# Patient Record
Sex: MALE | Race: BLACK OR AFRICAN AMERICAN | NOT HISPANIC OR LATINO | Employment: FULL TIME | URBAN - METROPOLITAN AREA
[De-identification: names, ages, dates, MRNs, and addresses within clinical notes are randomized per-mention and may not be internally consistent; named-entity substitution may affect disease eponyms.]

---

## 2020-08-08 ENCOUNTER — HOSPITAL ENCOUNTER (EMERGENCY)
Facility: HOSPITAL | Age: 30
Discharge: HOME | End: 2020-08-08
Attending: EMERGENCY MEDICINE
Payer: MEDICAID

## 2020-08-08 VITALS
DIASTOLIC BLOOD PRESSURE: 60 MMHG | RESPIRATION RATE: 16 BRPM | HEIGHT: 66 IN | TEMPERATURE: 97 F | WEIGHT: 150 LBS | SYSTOLIC BLOOD PRESSURE: 113 MMHG | OXYGEN SATURATION: 99 % | BODY MASS INDEX: 24.11 KG/M2 | HEART RATE: 71 BPM

## 2020-08-08 DIAGNOSIS — R36.9 PENILE DISCHARGE: Primary | ICD-10-CM

## 2020-08-08 PROBLEM — N43.3 HYDROCELE, RIGHT: Status: ACTIVE | Noted: 2019-08-01

## 2020-08-08 PROCEDURE — 87491 CHLMYD TRACH DNA AMP PROBE: CPT

## 2020-08-08 PROCEDURE — 96372 THER/PROPH/DIAG INJ SC/IM: CPT

## 2020-08-08 PROCEDURE — 25000003 PHARM REV CODE 250: Performed by: NURSE PRACTITIONER

## 2020-08-08 PROCEDURE — 63700000 PHARM REV CODE 250 ALT 637 W/O HCPCS: Performed by: NURSE PRACTITIONER

## 2020-08-08 PROCEDURE — 99284 EMERGENCY DEPT VISIT MOD MDM: CPT | Mod: 25

## 2020-08-08 PROCEDURE — 63600175 PHARM REV CODE 636 W HCPCS: Performed by: NURSE PRACTITIONER

## 2020-08-08 RX ORDER — CEFTRIAXONE 250 MG/1
250 INJECTION, POWDER, FOR SOLUTION INTRAMUSCULAR; INTRAVENOUS
Status: DISCONTINUED | OUTPATIENT
Start: 2020-08-08 | End: 2020-08-08

## 2020-08-08 RX ORDER — LIDOCAINE HYDROCHLORIDE 10 MG/ML
5 INJECTION INFILTRATION; PERINEURAL
Status: COMPLETED | OUTPATIENT
Start: 2020-08-08 | End: 2020-08-08

## 2020-08-08 RX ORDER — AZITHROMYCIN 250 MG/1
1000 TABLET, FILM COATED ORAL
Status: COMPLETED | OUTPATIENT
Start: 2020-08-08 | End: 2020-08-08

## 2020-08-08 RX ADMIN — LIDOCAINE HYDROCHLORIDE 1 ML: 10 INJECTION, SOLUTION INFILTRATION; PERINEURAL at 11:08

## 2020-08-08 RX ADMIN — AZITHROMYCIN MONOHYDRATE 1000 MG: 250 TABLET ORAL at 11:08

## 2020-08-08 RX ADMIN — CEFTRIAXONE SODIUM 250 MG: 250 INJECTION, POWDER, FOR SOLUTION INTRAMUSCULAR; INTRAVENOUS at 11:08

## 2020-08-08 NOTE — DISCHARGE INSTRUCTIONS
Notify all sexual partners of possible STD.  Abstain from sex x2 weeks after treatment. Return to the Emergency department for any worsening or failure to improve, otherwise follow up with your primary care provider.

## 2020-08-08 NOTE — ED PROVIDER NOTES
"Encounter Date: 8/8/2020       History     Chief Complaint   Patient presents with    Penile Discharge     started yesterday,no burning when urinating     The history is provided by the patient. No  was used.      Patient is a 30-year-old male who reports that starting yesterday he began experience pus from the urethra.  He reports that in the last 3 months he had 2 partners with which he always uses protection however 1 condom broke.  He is unsure if his partners having any symptoms.  He reports he has had no dysuria frequency urgency but reports a tickle in the private for the last week.      Review of patient's allergies indicates:   Allergen Reactions    Iodine Anaphylaxis    Shellfish containing products Anaphylaxis     Past Medical History:   Diagnosis Date    Asthma     Blood transfusion     Had transfusion as a baby     History reviewed. No pertinent surgical history.  History reviewed. No pertinent family history.  Social History     Tobacco Use    Smoking status: Never Smoker    Smokeless tobacco: Never Used   Substance Use Topics    Alcohol use: Yes     Alcohol/week: 3.0 standard drinks     Types: 1 Shots of liquor, 1 Cans of beer, 1 Glasses of wine per week     Comment: "recreationally"    Drug use: No     Review of Systems   Constitutional: Negative for appetite change, chills, diaphoresis, fatigue and fever.   HENT: Negative for congestion, ear discharge, ear pain, postnasal drip, rhinorrhea, sinus pressure, sneezing, sore throat and voice change.    Eyes: Negative for discharge, itching and visual disturbance.   Respiratory: Negative for cough, shortness of breath and wheezing.    Cardiovascular: Negative for chest pain, palpitations and leg swelling.   Gastrointestinal: Negative for abdominal pain, nausea and vomiting.   Endocrine: Negative for polydipsia, polyphagia and polyuria.   Genitourinary: Positive for discharge. Negative for difficulty urinating, dysuria, " frequency, hematuria, penile pain, penile swelling and urgency.   Musculoskeletal: Negative for arthralgias and myalgias.   Skin: Negative for rash and wound.   Neurological: Negative for dizziness, seizures, syncope and weakness.   Hematological: Negative for adenopathy. Does not bruise/bleed easily.   Psychiatric/Behavioral: Negative for agitation and self-injury. The patient is not nervous/anxious.        Physical Exam     Initial Vitals [08/08/20 1121]   BP Pulse Resp Temp SpO2   111/68 88 18 97.8 °F (36.6 °C) 100 %      MAP       --         Physical Exam    Nursing note and vitals reviewed.  Constitutional: He appears well-developed and well-nourished. He is not diaphoretic. No distress.   HENT:   Head: Normocephalic and atraumatic.   Right Ear: External ear normal.   Left Ear: External ear normal.   Nose: Nose normal.   Eyes: Pupils are equal, round, and reactive to light. Right eye exhibits no discharge. Left eye exhibits no discharge. No scleral icterus.   Neck: Normal range of motion.   Pulmonary/Chest: No respiratory distress.   Abdominal: He exhibits no distension. Hernia confirmed negative in the right inguinal area and confirmed negative in the left inguinal area.   Genitourinary:    Testes and penis normal.   Right testis shows no mass, no swelling and no tenderness. Right testis is descended. Left testis shows no mass, no swelling and no tenderness. Left testis is descended. Circumcised. No phimosis, paraphimosis, hypospadias, penile erythema or penile tenderness. No discharge found.   Musculoskeletal: Normal range of motion.   Lymphadenopathy: No inguinal adenopathy noted on the right or left side.   Neurological: He is alert and oriented to person, place, and time.   Skin: Skin is dry. Capillary refill takes less than 2 seconds.         ED Course   Procedures  Labs Reviewed   C. TRACHOMATIS/N. GONORRHOEAE BY AMP DNA          Imaging Results    None                            ED Course as of Aug 08  "1814   Sat Aug 08, 2020   1133 BP: 111/68 [VC]   1133 Temp: 97.8 °F (36.6 °C) [VC]   1133 Temp src: Oral [VC]   1133 Pulse: 88 [VC]   1133 Resp: 18 [VC]   1133 SpO2: 100 % [VC]   1156 Initial assessment:  30-year-old male who started having penile discharge yesterday.  He is currently sexually active.  He denies dysuria but endorses a "tickle".  On physical assessment there is no discharge noted, lumps bumps or abnormalities.  Patient is afebrile and nontoxic in no apparent distress.  Differential diagnosis includes STD, UTI, nongonococcal urethritis.  Plan:  Rocephin 250 mg IM and azithromycin 1 g p.o..    [VC]      ED Course User Index  [VC] Adarsh Claros DNP                Clinical Impression:       ICD-10-CM ICD-9-CM   1. Penile discharge  R36.9 788.7         Disposition:   Disposition: Discharged  Condition: Stable     ED Disposition Condition    Discharge Stable        ED Prescriptions     None        Follow-up Information     Follow up With Specialties Details Why Contact Info    Michael Avila MD Pediatrics Schedule an appointment as soon as possible for a visit   [address] [place] [address]  [place] [id number] [phone]                                       Adarsh Claros DNP  08/08/20 1814    "

## 2020-08-17 LAB
C TRACH DNA SPEC QL NAA+PROBE: DETECTED
N GONORRHOEA DNA SPEC QL NAA+PROBE: NOT DETECTED

## 2020-10-26 ENCOUNTER — HOSPITAL ENCOUNTER (EMERGENCY)
Facility: HOSPITAL | Age: 30
Discharge: HOME | End: 2020-10-26
Attending: EMERGENCY MEDICINE
Payer: MEDICAID

## 2020-10-26 VITALS
HEART RATE: 90 BPM | DIASTOLIC BLOOD PRESSURE: 74 MMHG | SYSTOLIC BLOOD PRESSURE: 118 MMHG | HEIGHT: 66 IN | RESPIRATION RATE: 16 BRPM | BODY MASS INDEX: 24.11 KG/M2 | WEIGHT: 150 LBS | TEMPERATURE: 98 F | OXYGEN SATURATION: 98 %

## 2020-10-26 DIAGNOSIS — R36.9 PENILE DISCHARGE: Primary | ICD-10-CM

## 2020-10-26 LAB
BILIRUB UR QL STRIP: NEGATIVE
CLARITY UR: CLEAR
COLOR UR: YELLOW
GLUCOSE UR QL STRIP: NEGATIVE
HGB UR QL STRIP: NEGATIVE
KETONES UR QL STRIP: NEGATIVE
LEUKOCYTE ESTERASE UR QL STRIP: NEGATIVE
NITRITE UR QL STRIP: NEGATIVE
PH UR STRIP: 6 [PH] (ref 5–8)
PROT UR QL STRIP: NEGATIVE
SP GR UR STRIP: 1.01 (ref 1–1.03)
URN SPEC COLLECT METH UR: ABNORMAL
UROBILINOGEN UR STRIP-ACNC: ABNORMAL EU/DL

## 2020-10-26 PROCEDURE — 81003 URINALYSIS AUTO W/O SCOPE: CPT

## 2020-10-26 PROCEDURE — 87661 TRICHOMONAS VAGINALIS AMPLIF: CPT

## 2020-10-26 PROCEDURE — 63700000 PHARM REV CODE 250 ALT 637 W/O HCPCS: Performed by: NURSE PRACTITIONER

## 2020-10-26 PROCEDURE — 25000003 PHARM REV CODE 250: Performed by: NURSE PRACTITIONER

## 2020-10-26 PROCEDURE — 96372 THER/PROPH/DIAG INJ SC/IM: CPT

## 2020-10-26 PROCEDURE — 63600175 PHARM REV CODE 636 W HCPCS: Performed by: NURSE PRACTITIONER

## 2020-10-26 PROCEDURE — 99284 EMERGENCY DEPT VISIT MOD MDM: CPT | Mod: 25

## 2020-10-26 RX ORDER — AZITHROMYCIN 250 MG/1
1000 TABLET, FILM COATED ORAL
Status: COMPLETED | OUTPATIENT
Start: 2020-10-26 | End: 2020-10-26

## 2020-10-26 RX ORDER — LIDOCAINE HYDROCHLORIDE 10 MG/ML
5 INJECTION INFILTRATION; PERINEURAL
Status: COMPLETED | OUTPATIENT
Start: 2020-10-26 | End: 2020-10-26

## 2020-10-26 RX ORDER — ONDANSETRON 4 MG/1
4 TABLET, ORALLY DISINTEGRATING ORAL
Status: COMPLETED | OUTPATIENT
Start: 2020-10-26 | End: 2020-10-26

## 2020-10-26 RX ORDER — CEFTRIAXONE 500 MG/1
250 INJECTION, POWDER, FOR SOLUTION INTRAMUSCULAR; INTRAVENOUS
Status: COMPLETED | OUTPATIENT
Start: 2020-10-26 | End: 2020-10-26

## 2020-10-26 RX ORDER — METRONIDAZOLE 500 MG/1
2000 TABLET ORAL
Status: COMPLETED | OUTPATIENT
Start: 2020-10-26 | End: 2020-10-26

## 2020-10-26 RX ADMIN — METRONIDAZOLE 2000 MG: 500 TABLET ORAL at 06:10

## 2020-10-26 RX ADMIN — LIDOCAINE HYDROCHLORIDE 5 ML: 10 INJECTION, SOLUTION INFILTRATION; PERINEURAL at 06:10

## 2020-10-26 RX ADMIN — CEFTRIAXONE SODIUM 250 MG: 500 INJECTION, POWDER, FOR SOLUTION INTRAMUSCULAR; INTRAVENOUS at 06:10

## 2020-10-26 RX ADMIN — ONDANSETRON 4 MG: 4 TABLET, ORALLY DISINTEGRATING ORAL at 06:10

## 2020-10-26 RX ADMIN — AZITHROMYCIN MONOHYDRATE 1000 MG: 250 TABLET ORAL at 06:10

## 2020-10-26 NOTE — ED PROVIDER NOTES
"Encounter Date: 10/26/2020    SCRIBE #1 NOTE: I, Kacie Daughertyary, am scribing for, and in the presence of,  Lili Hurst NP. I have scribed the following portions of the note - Other sections scribed: HPI, ROS.       History     Chief Complaint   Patient presents with    Exposure to STD     pt states "I want to be tested and treated, I have a discharge and I noticed it a few hours ago while I was at work."      CC: STD Exposure    30-year-old male with a PMHx of Asthma and blood transfusion presents to the ED complaining of penile discharge. He states he first noticed his symptoms at work last night. Pt denies fever, chills, dysuria, testicular pain, and any other associated symptoms. He denies SHx of new sex partners. No recent ABx usage. Pt has no other medical complaints.    The history is provided by the patient.     Review of patient's allergies indicates:   Allergen Reactions    Iodine Anaphylaxis    Shellfish containing products Anaphylaxis     Past Medical History:   Diagnosis Date    Asthma     Blood transfusion     Had transfusion as a baby     History reviewed. No pertinent surgical history.  History reviewed. No pertinent family history.  Social History     Tobacco Use    Smoking status: Never Smoker    Smokeless tobacco: Never Used   Substance Use Topics    Alcohol use: Yes     Alcohol/week: 3.0 standard drinks     Types: 1 Shots of liquor, 1 Cans of beer, 1 Glasses of wine per week     Comment: "recreationally"    Drug use: No     Review of Systems   Constitutional: Negative for chills and fever.   HENT: Negative for congestion.    Eyes: Negative for visual disturbance.   Respiratory: Negative for cough.    Cardiovascular: Negative for chest pain.   Gastrointestinal: Negative for abdominal pain.   Genitourinary: Positive for discharge. Negative for dysuria and testicular pain.   Musculoskeletal: Negative for back pain.   Skin: Negative for rash.   Neurological: Negative for headaches. "       Physical Exam     Initial Vitals [10/26/20 0545]   BP Pulse Resp Temp SpO2   118/74 90 16 98.2 °F (36.8 °C) 98 %      MAP       --         Physical Exam    Vitals reviewed.  Constitutional: He appears well-developed and well-nourished. He is not diaphoretic.  Non-toxic appearance. He does not have a sickly appearance. He does not appear ill. No distress.   HENT:   Head: Normocephalic and atraumatic.   Right Ear: External ear normal.   Left Ear: External ear normal.   Neck: Normal range of motion.   Pulmonary/Chest: No respiratory distress.   Abdominal: Soft. Normal appearance and bowel sounds are normal. There is no hepatosplenomegaly. There is no abdominal tenderness. There is no rigidity, no rebound, no guarding, no CVA tenderness, no tenderness at McBurney's point and negative Etienne's sign. No hernia.   Genitourinary:    Genitourinary Comments:  exam deferred by patient.      Musculoskeletal: Normal range of motion.   Neurological: He is alert and oriented to person, place, and time. GCS eye subscore is 4. GCS verbal subscore is 5. GCS motor subscore is 6.   Skin: Skin is warm, dry and intact. No rash noted. No erythema.   Psychiatric: He has a normal mood and affect. Thought content normal.         ED Course   Procedures  Labs Reviewed   URINALYSIS, REFLEX TO URINE CULTURE - Abnormal; Notable for the following components:       Result Value    Urobilinogen, UA 4.0-6.0 (*)     All other components within normal limits    Narrative:     Specimen Source->Urine   TRICHOMONAS VAGINALIS, RNA,QUAL, URINE          Imaging Results    None          Medical Decision Making:   ED Management:  30-year-old male presenting the ED with penile discharge.  Reports recent unprotected intercourse with a female partner.  Denies any fever, chills, abdominal pain, testicle pain or swelling.  No penile lesions.   exam deferred.  Urinalysis without infection.  Due to national shortage, unable to order GC swab.  Trichomonas  swab pending.  Patient was treated for GC and Trichomonas.  Instructed to follow up at [facility]D Clinic for further testing.  Patient verbalized understanding.    Based on my clinical evaluation, I do not appreciate any immediate, emergent, or life threatening condition or etiology that warrants additional workup today.  I feel the patient can be discharged with close follow-up care.            Scribe Attestation:   Scribe #1: I performed the above scribed service and the documentation accurately describes the services I performed. I attest to the accuracy of the note.                      Clinical Impression:       ICD-10-CM ICD-9-CM   1. Penile discharge  R36.9 788.7                      Disposition:   Disposition: Discharged  Condition: Stable     ED Disposition Condition    Discharge Stable        ED Prescriptions     None        Follow-up Information     Follow up With Specialties Details Why Contact Info    [facility]  Schedule an appointment as soon as possible for a visit in 1 day For follow-up if you do not have a primary care doctor [address] [place] [address]  [phone]      Michael Avila MD Pediatrics   [address] [facility] [address]  [facility] [address]  [phone]      [facility] Emergency Medicine Go to  If symptoms worsen [address]  [facility] [address] [phone]                          I, KAILEE Hurst, personally performed the services described in this documentation. All medical record entries made by the scribe were at my direction and in my presence.  I have reviewed the chart and agree that the record reflects my personal performance and is accurate and complete.               Lili Hurst, ARNULFO  10/26/20 0759

## 2020-10-26 NOTE — DISCHARGE INSTRUCTIONS
You were treated for possible gonorrhea and/or chlamydia  and trichomonas infection.  Your treatment today does not cover other sexually transmitted diseases including syphilis, herpes, HIV, hepatitis, etc.  Please follow up with your Primary Care Doctor or an STD clinic for additional testing for other STD's.      Please return to the Emergency Department for any new or worsening symptoms including: fever, chest pain, shortness of breath, loss of consciousness, dizziness, weakness, or any other concerns.     Please follow up with your Primary Care Provider within in the week. If you do not have one, you may contact the one listed on your discharge paperwork or you may also call the [facility] Appointment Desk at [phone] to schedule an appointment with one.

## 2020-10-29 LAB
T VAGINALIS RRNA SPEC QL NAA+PROBE: NOT DETECTED
TRICHOMONAS VAGINALIS RNA, QUAL, SOURCE: NORMAL

## 2021-10-26 ENCOUNTER — CLINICAL SUPPORT (OUTPATIENT)
Dept: URGENT CARE | Facility: CLINIC | Age: 31
End: 2021-10-26

## 2021-10-26 DIAGNOSIS — Z76.89 RETURN TO WORK EVALUATION: Primary | ICD-10-CM

## 2021-10-26 PROCEDURE — 99499 RETURN TO WORK EXAM: BASIC: ICD-10-PCS | Mod: S$GLB,,, | Performed by: PHYSICIAN ASSISTANT

## 2021-10-26 PROCEDURE — 99499 UNLISTED E&M SERVICE: CPT | Mod: S$GLB,,, | Performed by: PHYSICIAN ASSISTANT

## 2022-02-15 ENCOUNTER — HOSPITAL ENCOUNTER (EMERGENCY)
Facility: HOSPITAL | Age: 32
Discharge: HOME | End: 2022-02-15
Attending: EMERGENCY MEDICINE
Payer: MEDICAID

## 2022-02-15 VITALS
HEART RATE: 88 BPM | RESPIRATION RATE: 16 BRPM | DIASTOLIC BLOOD PRESSURE: 69 MMHG | TEMPERATURE: 98 F | SYSTOLIC BLOOD PRESSURE: 111 MMHG | OXYGEN SATURATION: 98 %

## 2022-02-15 DIAGNOSIS — R10.84 GENERALIZED ABDOMINAL PAIN: ICD-10-CM

## 2022-02-15 DIAGNOSIS — S63.91XA SPRAIN AND STRAIN OF RIGHT HAND: ICD-10-CM

## 2022-02-15 DIAGNOSIS — S66.911A SPRAIN AND STRAIN OF RIGHT HAND: ICD-10-CM
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
## 2022-02-15 DIAGNOSIS — S16.1XXA STRAIN OF NECK MUSCLE, INITIAL ENCOUNTER: ICD-10-CM

## 2022-02-15 PROCEDURE — 25000003 PHARM REV CODE 250: Performed by: PHYSICIAN ASSISTANT

## 2022-02-15 PROCEDURE — 99283 EMERGENCY DEPT VISIT LOW MDM: CPT

## 2022-02-15 RX ORDER — LIDOCAINE HYDROCHLORIDE 20 MG/ML
10 SOLUTION OROPHARYNGEAL ONCE
Status: COMPLETED | OUTPATIENT
Start: 2022-02-15 | End: 2022-02-15

## 2022-02-15 RX ORDER — MAG HYDROX/ALUMINUM HYD/SIMETH 200-200-20
30 SUSPENSION, ORAL (FINAL DOSE FORM) ORAL ONCE
Status: COMPLETED | OUTPATIENT
Start: 2022-02-15 | End: 2022-02-15

## 2022-02-15 RX ADMIN — LIDOCAINE HYDROCHLORIDE 10 ML: 20 SOLUTION ORAL; TOPICAL at 07:02

## 2022-02-15 RX ADMIN — MAGNESIUM HYDROXIDE/ALUMINUM HYDROXICE/SIMETHICONE 30 ML: 120; 1200; 1200 SUSPENSION ORAL at 07:02

## 2022-02-15 NOTE — Clinical Note
"Kev Guthrielayla Bah was seen and treated in our emergency department on 2/15/2022.  He may return to work on 02/17/2022.       If you have any questions or concerns, please don't hesitate to call.      Darryl Garza PA-C"
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
## 2022-02-16 NOTE — DISCHARGE INSTRUCTIONS
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
## 2022-10-30 ENCOUNTER — HOSPITAL ENCOUNTER (EMERGENCY)
Facility: HOSPITAL | Age: 32
Discharge: HOME | End: 2022-10-30
Attending: EMERGENCY MEDICINE
Payer: MEDICAID

## 2022-10-30 VITALS
RESPIRATION RATE: 18 BRPM | DIASTOLIC BLOOD PRESSURE: 80 MMHG | TEMPERATURE: 99 F | BODY MASS INDEX: 26.52 KG/M2 | WEIGHT: 165 LBS | HEART RATE: 82 BPM | HEIGHT: 66 IN | OXYGEN SATURATION: 99 % | SYSTOLIC BLOOD PRESSURE: 135 MMHG

## 2022-10-30 DIAGNOSIS — R05.9 COUGH: ICD-10-CM

## 2022-10-30 DIAGNOSIS — M94.0 COSTOCHONDRITIS: Primary | ICD-10-CM

## 2022-10-30 DIAGNOSIS — R07.9 CHEST PAIN: ICD-10-CM

## 2022-10-30 LAB
ALBUMIN SERPL BCP-MCNC: 4.2 G/DL (ref 3.5–5.2)
ALP SERPL-CCNC: 99 U/L (ref 55–135)
ALT SERPL W/O P-5'-P-CCNC: 30 U/L (ref 10–44)
ANION GAP SERPL CALC-SCNC: 6 MMOL/L (ref 8–16)
AST SERPL-CCNC: 20 U/L (ref 10–40)
BASOPHILS # BLD AUTO: 0.04 K/UL (ref 0–0.2)
BASOPHILS NFR BLD: 0.8 % (ref 0–1.9)
BILIRUB SERPL-MCNC: 0.8 MG/DL (ref 0.1–1)
BUN SERPL-MCNC: 8 MG/DL (ref 6–20)
CALCIUM SERPL-MCNC: 9 MG/DL (ref 8.7–10.5)
CHLORIDE SERPL-SCNC: 108 MMOL/L (ref 95–110)
CO2 SERPL-SCNC: 27 MMOL/L (ref 23–29)
CREAT SERPL-MCNC: 1.2 MG/DL (ref 0.5–1.4)
D DIMER PPP IA.FEU-MCNC: <0.19 MG/L FEU
DIFFERENTIAL METHOD: ABNORMAL
EOSINOPHIL # BLD AUTO: 0 K/UL (ref 0–0.5)
EOSINOPHIL NFR BLD: 0.8 % (ref 0–8)
ERYTHROCYTE [DISTWIDTH] IN BLOOD BY AUTOMATED COUNT: 12.6 % (ref 11.5–14.5)
EST. GFR  (NO RACE VARIABLE): >60 ML/MIN/1.73 M^2
GLUCOSE SERPL-MCNC: 104 MG/DL (ref 70–110)
HCT VFR BLD AUTO: 45.5 % (ref 40–54)
HGB BLD-MCNC: 15.5 G/DL (ref 14–18)
IMM GRANULOCYTES # BLD AUTO: 0.01 K/UL (ref 0–0.04)
IMM GRANULOCYTES NFR BLD AUTO: 0.2 % (ref 0–0.5)
LYMPHOCYTES # BLD AUTO: 1.5 K/UL (ref 1–4.8)
LYMPHOCYTES NFR BLD: 28.9 % (ref 18–48)
MCH RBC QN AUTO: 32.6 PG (ref 27–31)
MCHC RBC AUTO-ENTMCNC: 34.1 G/DL (ref 32–36)
MCV RBC AUTO: 96 FL (ref 82–98)
MONOCYTES # BLD AUTO: 0.3 K/UL (ref 0.3–1)
MONOCYTES NFR BLD: 6 % (ref 4–15)
NEUTROPHILS # BLD AUTO: 3.4 K/UL (ref 1.8–7.7)
NEUTROPHILS NFR BLD: 63.3 % (ref 38–73)
NRBC BLD-RTO: 0 /100 WBC
PLATELET # BLD AUTO: 227 K/UL (ref 150–450)
PMV BLD AUTO: 9.6 FL (ref 9.2–12.9)
POTASSIUM SERPL-SCNC: 4.1 MMOL/L (ref 3.5–5.1)
PROT SERPL-MCNC: 7.4 G/DL (ref 6–8.4)
RBC # BLD AUTO: 4.76 M/UL (ref 4.6–6.2)
SODIUM SERPL-SCNC: 141 MMOL/L (ref 136–145)
TROPONIN I SERPL DL<=0.01 NG/ML-MCNC: <0.006 NG/ML (ref 0–0.03)
WBC # BLD AUTO: 5.29 K/UL (ref 3.9–12.7)

## 2022-10-30 PROCEDURE — 85025 COMPLETE CBC W/AUTO DIFF WBC: CPT | Performed by: NURSE PRACTITIONER

## 2022-10-30 PROCEDURE — 96374 THER/PROPH/DIAG INJ IV PUSH: CPT

## 2022-10-30 PROCEDURE — 93010 EKG 12-LEAD: ICD-10-PCS | Mod: ,,, | Performed by: INTERNAL MEDICINE

## 2022-10-30 PROCEDURE — 93005 ELECTROCARDIOGRAM TRACING: CPT

## 2022-10-30 PROCEDURE — 63600175 PHARM REV CODE 636 W HCPCS: Performed by: NURSE PRACTITIONER

## 2022-10-30 PROCEDURE — 80053 COMPREHEN METABOLIC PANEL: CPT | Performed by: NURSE PRACTITIONER

## 2022-10-30 PROCEDURE — 99285 EMERGENCY DEPT VISIT HI MDM: CPT | Mod: 25

## 2022-10-30 PROCEDURE — 84484 ASSAY OF TROPONIN QUANT: CPT | Performed by: NURSE PRACTITIONER

## 2022-10-30 PROCEDURE — 93010 ELECTROCARDIOGRAM REPORT: CPT | Mod: ,,, | Performed by: INTERNAL MEDICINE

## 2022-10-30 PROCEDURE — 85379 FIBRIN DEGRADATION QUANT: CPT | Performed by: NURSE PRACTITIONER

## 2022-10-30 RX ORDER — KETOROLAC TROMETHAMINE 30 MG/ML
15 INJECTION, SOLUTION INTRAMUSCULAR; INTRAVENOUS
Status: COMPLETED | OUTPATIENT
Start: 2022-10-30 | End: 2022-10-30

## 2022-10-30 RX ORDER — TIZANIDINE 2 MG/1
2 TABLET ORAL EVERY 8 HOURS PRN
Qty: 15 TABLET | Refills: 0 | Status: SHIPPED | OUTPATIENT
Start: 2022-10-30

## 2022-10-30 RX ORDER — ORPHENADRINE CITRATE 30 MG/ML
60 INJECTION INTRAMUSCULAR; INTRAVENOUS
Status: DISCONTINUED | OUTPATIENT
Start: 2022-10-30 | End: 2022-10-30

## 2022-10-30 RX ORDER — NAPROXEN 500 MG/1
500 TABLET ORAL EVERY 12 HOURS PRN
Qty: 20 TABLET | Refills: 0 | Status: SHIPPED | OUTPATIENT
Start: 2022-10-30

## 2022-10-30 RX ADMIN — KETOROLAC TROMETHAMINE 15 MG: 30 INJECTION, SOLUTION INTRAMUSCULAR at 01:10

## 2022-10-30 NOTE — ED PROVIDER NOTES
"Encounter Date: 10/30/2022    SCRIBE #1 NOTE: I, Felicity Talavera, am scribing for, and in the presence of,  Topher Flores NP. I have scribed the following portions of the note - Other sections scribed: EDUARDO, ESTELLA.     History     Chief Complaint   Patient presents with    Chest Pain     Left lower rib pain x 1wk when cough, sneezing, or palpation     A 32 y.o. male with a pertinent PMHx of asthma, presents to the ED for evaluation of sharp, severe left-sided chest pain that began 1 week ago. Patient reports that his pain began in his back, but now it is in his chest. It has been worsening over the past week. His pain is exacerbated with coughing, sneezing, lying down, and in certain positions. He took Ibuprofen with mild relief. Allergy to Iodine. No other exacerbating or alleviating factors. Patient denies any other associated symptoms.      The history is provided by the patient. No  was used.   Review of patient's allergies indicates:   Allergen Reactions    Iodine Anaphylaxis    Shellfish containing products Anaphylaxis     Past Medical History:   Diagnosis Date    Asthma     Blood transfusion     Had transfusion as a baby     History reviewed. No pertinent surgical history.  History reviewed. No pertinent family history.  Social History     Tobacco Use    Smoking status: Every Day     Types: Cigarettes    Smokeless tobacco: Never   Substance Use Topics    Alcohol use: Yes     Alcohol/week: 3.0 standard drinks     Types: 1 Shots of liquor, 1 Cans of beer, 1 Glasses of wine per week     Comment: "recreationally"    Drug use: Yes     Types: Marijuana     Review of Systems   Constitutional:  Negative for fever.   HENT:  Negative for sore throat.    Eyes:  Negative for visual disturbance.   Respiratory:  Negative for shortness of breath.    Cardiovascular:  Positive for chest pain (left-sided, sharp, severe).   Gastrointestinal:  Negative for abdominal pain.   Genitourinary:  Negative for " dysuria.   Skin:  Negative for rash.   Neurological:  Negative for headaches.     Physical Exam     Initial Vitals [10/30/22 1150]   BP Pulse Resp Temp SpO2   129/73 88 16 98.5 °F (36.9 °C) 99 %      MAP       --         Physical Exam    Nursing note and vitals reviewed.  Constitutional: He appears well-developed and well-nourished. He is not diaphoretic. No distress.   HENT:   Head: Normocephalic and atraumatic.   Right Ear: External ear normal.   Left Ear: External ear normal.   Nose: Nose normal.   Eyes: EOM are normal. Right eye exhibits no discharge. Left eye exhibits no discharge.   Neck: Neck supple. No tracheal deviation present.   Normal range of motion.  Cardiovascular:  Normal rate.           Pulmonary/Chest: Effort normal and breath sounds normal. No accessory muscle usage or stridor. No tachypnea. No respiratory distress. He exhibits tenderness.   Chest rise even and symmetrical.  Respiratory effort normal.  Reproducible chest tenderness to the left anterior chest wall   Abdominal: Abdomen is soft. He exhibits no distension. There is no abdominal tenderness.   Musculoskeletal:         General: No tenderness. Normal range of motion.      Cervical back: Normal range of motion and neck supple.     Neurological: He is alert and oriented to person, place, and time. He has normal strength. No cranial nerve deficit.   Skin: Skin is warm and dry.   Psychiatric: He has a normal mood and affect. His behavior is normal. Judgment and thought content normal.       ED Course   Procedures  Labs Reviewed   CBC W/ AUTO DIFFERENTIAL - Abnormal; Notable for the following components:       Result Value    MCH 32.6 (*)     All other components within normal limits   COMPREHENSIVE METABOLIC PANEL - Abnormal; Notable for the following components:    Anion Gap 6 (*)     All other components within normal limits   TROPONIN I   D DIMER, QUANTITATIVE          Imaging Results              X-Ray Chest PA And Lateral (Final result)   "Result time 10/30/22 12:39:59      Final result by Jamil Bowers MD (10/30/22 12:39:59)                   Impression:      No acute cardiopulmonary finding.      Electronically signed by: Jamil Bowers MD  Date:    10/30/2022  Time:    12:39               Narrative:    EXAMINATION:  XR CHEST PA AND LATERAL    CLINICAL HISTORY:  Provided history is "  Cough, unspecified".    TECHNIQUE:  Frontal and lateral views of the chest were performed.    COMPARISON:  None.    FINDINGS:  Cardiac silhouette is not enlarged. No focal consolidation.  No sizable pleural effusion.  No pneumothorax.                                       Medications   ketorolac injection 15 mg (15 mg Intravenous Given 10/30/22 1328)     Medical Decision Making:   History:   Old Medical Records: I decided to obtain old medical records.  Clinical Tests:   Lab Tests: Ordered and Reviewed  Radiological Study: Ordered and Reviewed  Medical Tests: Ordered and Reviewed  ED Management:  32 year old patient presenting secondary to chest pain. Patient is at lower risk of ACS due to risk factors and HPI with a heart score of 1. EKG was reassuring and chest xray showed nothing acute. Most likely musculoskeletal cause of patient.     PE: normal rate, no sob/recent immobilization/surgery/travel/family history. PERC negative  Pneumonia: chest xray negative. No fever. No cough and lungs non consistent with pna  Tamponade: unlikely due to chest xray and ekg  STEMI: No STEMI on ekg  Dissection: equal pulses bilaterally and no ripping chest pain to the back  Esophageal rupture: no dysphagia or vomiting and chest xray negative for mediastinal air  Arrhythmia: no arrhythmia on ekg  CHF: no fluid overload on Cxr and physical exam  Pneumothorax: bilateral breath sounds and no signs of pneumothorax on chest xray         Scribe Attestation:   Scribe #1: I performed the above scribed service and the documentation accurately describes the services I performed. I attest to " the accuracy of the note.                   Clinical Impression:   Final diagnoses:  [R07.9] Chest pain  [R05.9] Cough  [M94.0] Costochondritis (Primary)        ED Disposition Condition    Discharge Stable          ED Prescriptions       Medication Sig Dispense Start Date End Date Auth. Provider    naproxen (NAPROSYN) 500 MG tablet Take 1 tablet (500 mg total) by mouth every 12 (twelve) hours as needed (Pain). 20 tablet 10/30/2022 -- Topher Flores NP    tiZANidine (ZANAFLEX) 2 MG tablet Take 1 tablet (2 mg total) by mouth every 8 (eight) hours as needed (Muscle Spasms). 15 tablet 10/30/2022 -- Topher Flores NP          Follow-up Information       Follow up With Specialties Details Why Contact Info    Michael Avila MD Pediatrics Schedule an appointment as soon as possible for a visit  For further evaluation [address] [facility]  [place] [address]  [phone]      [facility] [place] - Emergency Dept Emergency Medicine Go to  If symptoms worsen, As needed [address]  [facility] [address] [phone]        I, Topher Flores NP, personally performed the services described in this documentation. All medical record entries made by the scribe were at my direction and in my presence. I have reviewed the chart and agree that the record reflects my personal performance and is accurate and complete.      Topher Flores NP  10/30/22 [id number]

## 2022-10-30 NOTE — ED TRIAGE NOTES
Pt. Reports he has left anterior chest pain. Pt. Reports pain is present with movement and gestures. Pt. Denies any falls, injures or trauma.

## 2022-10-30 NOTE — DISCHARGE INSTRUCTIONS
